# Patient Record
Sex: MALE | Race: WHITE | NOT HISPANIC OR LATINO | URBAN - METROPOLITAN AREA
[De-identification: names, ages, dates, MRNs, and addresses within clinical notes are randomized per-mention and may not be internally consistent; named-entity substitution may affect disease eponyms.]

---

## 2017-02-08 ENCOUNTER — IMPORTED ENCOUNTER (OUTPATIENT)
Dept: URBAN - METROPOLITAN AREA CLINIC 9 | Facility: CLINIC | Age: 82
End: 2017-02-08

## 2017-06-08 ENCOUNTER — IMPORTED ENCOUNTER (OUTPATIENT)
Dept: URBAN - METROPOLITAN AREA CLINIC 9 | Facility: CLINIC | Age: 82
End: 2017-06-08

## 2017-08-09 ENCOUNTER — IMPORTED ENCOUNTER (OUTPATIENT)
Dept: URBAN - METROPOLITAN AREA CLINIC 9 | Facility: CLINIC | Age: 82
End: 2017-08-09

## 2018-02-07 ENCOUNTER — IMPORTED ENCOUNTER (OUTPATIENT)
Dept: URBAN - METROPOLITAN AREA CLINIC 9 | Facility: CLINIC | Age: 83
End: 2018-02-07

## 2018-06-27 ENCOUNTER — IMPORTED ENCOUNTER (OUTPATIENT)
Dept: URBAN - METROPOLITAN AREA CLINIC 9 | Facility: CLINIC | Age: 83
End: 2018-06-27

## 2018-08-08 ENCOUNTER — IMPORTED ENCOUNTER (OUTPATIENT)
Dept: URBAN - METROPOLITAN AREA CLINIC 9 | Facility: CLINIC | Age: 83
End: 2018-08-08

## 2019-02-04 ENCOUNTER — IMPORTED ENCOUNTER (OUTPATIENT)
Dept: URBAN - METROPOLITAN AREA CLINIC 9 | Facility: CLINIC | Age: 84
End: 2019-02-04

## 2019-06-19 ENCOUNTER — IMPORTED ENCOUNTER (OUTPATIENT)
Dept: URBAN - METROPOLITAN AREA CLINIC 9 | Facility: CLINIC | Age: 84
End: 2019-06-19

## 2019-12-09 ENCOUNTER — IMPORTED ENCOUNTER (OUTPATIENT)
Dept: URBAN - METROPOLITAN AREA CLINIC 9 | Facility: CLINIC | Age: 84
End: 2019-12-09

## 2020-06-24 ENCOUNTER — IMPORTED ENCOUNTER (OUTPATIENT)
Dept: URBAN - METROPOLITAN AREA CLINIC 9 | Facility: CLINIC | Age: 85
End: 2020-06-24

## 2020-08-03 ENCOUNTER — IMPORTED ENCOUNTER (OUTPATIENT)
Dept: URBAN - METROPOLITAN AREA CLINIC 9 | Facility: CLINIC | Age: 85
End: 2020-08-03

## 2021-01-11 ENCOUNTER — IMPORTED ENCOUNTER (OUTPATIENT)
Dept: URBAN - METROPOLITAN AREA CLINIC 9 | Facility: CLINIC | Age: 86
End: 2021-01-11

## 2021-05-17 ENCOUNTER — IMPORTED ENCOUNTER (OUTPATIENT)
Dept: URBAN - METROPOLITAN AREA CLINIC 9 | Facility: CLINIC | Age: 86
End: 2021-05-17

## 2021-06-21 ENCOUNTER — IMPORTED ENCOUNTER (OUTPATIENT)
Dept: URBAN - METROPOLITAN AREA CLINIC 9 | Facility: CLINIC | Age: 86
End: 2021-06-21

## 2021-06-21 PROBLEM — H25.13: Noted: 2021-06-21

## 2021-06-21 PROBLEM — H11.153: Noted: 2021-06-21

## 2021-06-21 PROBLEM — H35.3111: Noted: 2021-06-21

## 2021-06-21 PROBLEM — H35.3221: Noted: 2021-06-21

## 2021-10-16 ASSESSMENT — KERATOMETRY
OD_K1POWER_DIOPTERS: 43.75
OS_K1POWER_DIOPTERS: 43.75
OD_K1POWER_DIOPTERS: 43.25
OD_AXISANGLE2_DEGREES: 174
OS_AXISANGLE2_DEGREES: 85
OS_AXISANGLE2_DEGREES: 156
OD_AXISANGLE2_DEGREES: 1
OS_K2POWER_DIOPTERS: 44
OD_K2POWER_DIOPTERS: 45
OS_AXISANGLE_DEGREES: 115
OD_K2POWER_DIOPTERS: 43
OD_AXISANGLE2_DEGREES: 66
OD_AXISANGLE_DEGREES: 84
OS_AXISANGLE_DEGREES: 175
OD_K1POWER_DIOPTERS: 43.5
OS_AXISANGLE2_DEGREES: 171
OS_AXISANGLE2_DEGREES: 25
OS_K2POWER_DIOPTERS: 44.25
OS_K1POWER_DIOPTERS: 44
OD_AXISANGLE_DEGREES: 156
OD_AXISANGLE_DEGREES: 76
OS_AXISANGLE_DEGREES: 81
OD_K2POWER_DIOPTERS: 44
OS_K1POWER_DIOPTERS: 43.75
OS_K2POWER_DIOPTERS: 43.75
OD_AXISANGLE2_DEGREES: 166
OS_AXISANGLE_DEGREES: 66
OD_AXISANGLE2_DEGREES: 83
OD_K1POWER_DIOPTERS: 43.5
OD_AXISANGLE_DEGREES: 173
OD_AXISANGLE_DEGREES: 91
OS_K1POWER_DIOPTERS: 43.75
OD_K2POWER_DIOPTERS: 44
OD_K1POWER_DIOPTERS: 44
OS_K2POWER_DIOPTERS: 44.25
OD_K2POWER_DIOPTERS: 44

## 2021-10-16 ASSESSMENT — VISUAL ACUITY
OD_CC: 20/25 - SN
OS_CC: 20/400 SN
OD_SC: 20/60 SN
OD_CC: 20/40 SN
OD_CC: 20/20 -2 SN
OD_CC: 20/30 SN
OD_CC: 20/25 SN
OD_CC: 20/30 -2 SN
OS_SC: CF 2FT SN
OS_CC: 20/400 SN
OD_CC: 20/25 SN
OS_CC: 20/400 SN
OD_CC: 20/30 -2 SN
OD_CC: 20/30 SN
OD_CC: 20/25 -2 SN
OS_CC: 20/400 SN
OD_CC: 20/25 - SN
OS_CC: 20/400 SN
OS_CC: 20/400 SN
OD_CC: 20/25 -2 SN
OS_CC: 20/400 SN
OS_CC: 20/400 SN
OD_CC: 20/20 SN
OS_CC: 20/400 SN
OS_CC: 20/200 - SN
OD_CC: 20/20 SN
OD_CC: 20/25 - SN
OD_CC: 20/30 - SN
OS_CC: 20/400 SN
OD_CC: 20/20 - SN
OD_CC: 20/20 SN
OD_CC: 20/30 - SN
OD_CC: 20/30 + SN
OS_CC: 20/400 SN
OD_CC: 20/40 SN
OS_CC: 20/400 SN

## 2021-10-16 ASSESSMENT — TONOMETRY
OS_IOP_MMHG: 18
OS_IOP_MMHG: 15
OD_IOP_MMHG: 16
OD_IOP_MMHG: 15
OS_IOP_MMHG: 13
OS_IOP_MMHG: 11
OS_IOP_MMHG: 13
OD_IOP_MMHG: 15
OD_IOP_MMHG: 12
OS_IOP_MMHG: 19
OD_IOP_MMHG: 14
OD_IOP_MMHG: 13
OS_IOP_MMHG: 10
OS_IOP_MMHG: 10
OS_IOP_MMHG: 13
OS_IOP_MMHG: 11
OD_IOP_MMHG: 14
OS_IOP_MMHG: 16
OD_IOP_MMHG: 15
OD_IOP_MMHG: 10
OD_IOP_MMHG: 18
OD_IOP_MMHG: 11
OS_IOP_MMHG: 14
OD_IOP_MMHG: 13
OS_IOP_MMHG: 13
OD_IOP_MMHG: 10

## 2021-11-02 ENCOUNTER — PREPPED CHART (OUTPATIENT)
Dept: URBAN - METROPOLITAN AREA CLINIC 4 | Facility: CLINIC | Age: 86
End: 2021-11-02

## 2021-11-02 ASSESSMENT — KERATOMETRY
OS_K2POWER_DIOPTERS: 44.25
OD_AXISANGLE_DEGREES: 84
OD_K2POWER_DIOPTERS: 44
OS_AXISANGLE_DEGREES: 115
OS_AXISANGLE2_DEGREES: 25
OD_AXISANGLE2_DEGREES: 174
OS_K1POWER_DIOPTERS: 43.75
OD_K1POWER_DIOPTERS: 43.25

## 2021-11-08 ENCOUNTER — ESTABLISHED PATIENT (OUTPATIENT)
Dept: URBAN - METROPOLITAN AREA CLINIC 4 | Facility: CLINIC | Age: 86
End: 2021-11-08

## 2021-11-08 DIAGNOSIS — H35.3221: ICD-10-CM

## 2021-11-08 DIAGNOSIS — H35.3111: ICD-10-CM

## 2021-11-08 PROCEDURE — 92014 COMPRE OPH EXAM EST PT 1/>: CPT

## 2021-11-08 PROCEDURE — 92134 CPTRZ OPH DX IMG PST SGM RTA: CPT

## 2021-11-08 ASSESSMENT — TONOMETRY
OD_IOP_MMHG: 11
OS_IOP_MMHG: 14

## 2021-11-08 ASSESSMENT — KERATOMETRY
OD_K2POWER_DIOPTERS: 44
OD_AXISANGLE2_DEGREES: 174
OD_K1POWER_DIOPTERS: 43.25
OD_AXISANGLE_DEGREES: 84
OS_K2POWER_DIOPTERS: 44.25
OS_AXISANGLE2_DEGREES: 25
OS_AXISANGLE_DEGREES: 115
OS_K1POWER_DIOPTERS: 43.75

## 2021-11-08 ASSESSMENT — VISUAL ACUITY
OS_CC: 20/300
OD_CC: 20/30-2
OS_PH: 20/400

## 2022-03-04 NOTE — PATIENT DISCUSSION
Patient has appt with Dr Fran Jarquin later today. Discussed finding and eitology; recommend carotid Doppler, wrote out for patient to take with her to appt.

## 2022-06-22 ENCOUNTER — ESTABLISHED PATIENT (OUTPATIENT)
Dept: URBAN - METROPOLITAN AREA CLINIC 4 | Facility: CLINIC | Age: 87
End: 2022-06-22

## 2022-06-22 DIAGNOSIS — H35.3111: ICD-10-CM

## 2022-06-22 DIAGNOSIS — H11.153: ICD-10-CM

## 2022-06-22 DIAGNOSIS — H35.3221: ICD-10-CM

## 2022-06-22 PROCEDURE — 92014 COMPRE OPH EXAM EST PT 1/>: CPT

## 2022-06-22 PROCEDURE — 92134 CPTRZ OPH DX IMG PST SGM RTA: CPT

## 2022-06-22 PROCEDURE — 92015 DETERMINE REFRACTIVE STATE: CPT

## 2022-06-22 ASSESSMENT — KERATOMETRY
OD_K1POWER_DIOPTERS: 43.25
OD_AXISANGLE2_DEGREES: 177
OS_AXISANGLE_DEGREES: 45
OS_K1POWER_DIOPTERS: 44.00
OS_AXISANGLE2_DEGREES: 135
OS_K2POWER_DIOPTERS: 44.25
OD_K2POWER_DIOPTERS: 44.00
OD_AXISANGLE_DEGREES: 87

## 2022-06-22 ASSESSMENT — VISUAL ACUITY
OD_GLARE: 20/40-1
OS_SC: 20/400
OD_SC: 20/50
OU_CC: 20/25
OS_CC: 20/400
OU_SC: 20/50
OD_CC: 20/25-1

## 2022-06-22 ASSESSMENT — TONOMETRY
OD_IOP_MMHG: 14
OS_IOP_MMHG: 14

## 2022-11-07 ENCOUNTER — ESTABLISHED PATIENT (OUTPATIENT)
Dept: URBAN - METROPOLITAN AREA CLINIC 4 | Facility: CLINIC | Age: 87
End: 2022-11-07

## 2022-11-07 DIAGNOSIS — H35.3111: ICD-10-CM

## 2022-11-07 DIAGNOSIS — H35.3221: ICD-10-CM

## 2022-11-07 PROCEDURE — 92134 CPTRZ OPH DX IMG PST SGM RTA: CPT

## 2022-11-07 PROCEDURE — 92014 COMPRE OPH EXAM EST PT 1/>: CPT

## 2022-11-07 ASSESSMENT — TONOMETRY
OD_IOP_MMHG: 12
OS_IOP_MMHG: 11

## 2022-11-07 ASSESSMENT — KERATOMETRY
OD_AXISANGLE2_DEGREES: 177
OD_K1POWER_DIOPTERS: 43.25
OS_AXISANGLE2_DEGREES: 135
OS_AXISANGLE_DEGREES: 45
OD_K2POWER_DIOPTERS: 44.00
OS_K1POWER_DIOPTERS: 44.00
OS_K2POWER_DIOPTERS: 44.25
OD_AXISANGLE_DEGREES: 87

## 2022-11-07 ASSESSMENT — VISUAL ACUITY
OS_SC: 20/400
OD_SC: 20/30-2

## 2023-06-12 ENCOUNTER — ESTABLISHED PATIENT (OUTPATIENT)
Dept: URBAN - METROPOLITAN AREA CLINIC 4 | Facility: CLINIC | Age: 88
End: 2023-06-12

## 2023-06-12 DIAGNOSIS — H25.13: ICD-10-CM

## 2023-06-12 DIAGNOSIS — H11.153: ICD-10-CM

## 2023-06-12 DIAGNOSIS — H35.3221: ICD-10-CM

## 2023-06-12 DIAGNOSIS — H35.3111: ICD-10-CM

## 2023-06-12 PROCEDURE — 92014 COMPRE OPH EXAM EST PT 1/>: CPT

## 2023-06-12 PROCEDURE — 92134 CPTRZ OPH DX IMG PST SGM RTA: CPT

## 2023-06-12 PROCEDURE — 92015 DETERMINE REFRACTIVE STATE: CPT

## 2023-06-12 ASSESSMENT — KERATOMETRY
OS_K1POWER_DIOPTERS: 44.00
OS_K2POWER_DIOPTERS: 44.25
OD_AXISANGLE2_DEGREES: 3
OD_K1POWER_DIOPTERS: 43.00
OS_AXISANGLE2_DEGREES: 12
OD_AXISANGLE_DEGREES: 93
OS_AXISANGLE_DEGREES: 102
OD_K2POWER_DIOPTERS: 44.00

## 2023-06-12 ASSESSMENT — VISUAL ACUITY
OS_CC: 20/400
OD_CC: 20/40+2
OU_CC: 20/40+2
OD_GLARE: 20/50

## 2023-06-12 ASSESSMENT — TONOMETRY
OS_IOP_MMHG: 12
OD_IOP_MMHG: 13

## 2023-11-21 ENCOUNTER — ESTABLISHED PATIENT (OUTPATIENT)
Facility: LOCATION | Age: 88
End: 2023-11-21

## 2023-11-21 DIAGNOSIS — H35.3222: ICD-10-CM

## 2023-11-21 DIAGNOSIS — H35.3111: ICD-10-CM

## 2023-11-21 PROCEDURE — 92014 COMPRE OPH EXAM EST PT 1/>: CPT

## 2023-11-21 PROCEDURE — 92134 CPTRZ OPH DX IMG PST SGM RTA: CPT

## 2023-11-21 ASSESSMENT — TONOMETRY
OD_IOP_MMHG: 17
OS_IOP_MMHG: 17

## 2023-11-21 ASSESSMENT — VISUAL ACUITY
OS_CC: CF 2FT
OD_CC: 20/20-1

## 2024-06-27 ENCOUNTER — ESTABLISHED PATIENT (OUTPATIENT)
Facility: LOCATION | Age: 89
End: 2024-06-27

## 2024-06-27 DIAGNOSIS — H11.153: ICD-10-CM

## 2024-06-27 DIAGNOSIS — H35.3111: ICD-10-CM

## 2024-06-27 DIAGNOSIS — H35.3222: ICD-10-CM

## 2024-06-27 DIAGNOSIS — H25.13: ICD-10-CM

## 2024-06-27 PROCEDURE — 92015 DETERMINE REFRACTIVE STATE: CPT

## 2024-06-27 PROCEDURE — 92134 CPTRZ OPH DX IMG PST SGM RTA: CPT

## 2024-06-27 PROCEDURE — 92014 COMPRE OPH EXAM EST PT 1/>: CPT

## 2024-06-27 ASSESSMENT — VISUAL ACUITY
OD_CC: 20/25
OS_CC: 20/400
OU_CC: 20/25
OD_GLARE: 20/50-1

## 2024-06-27 ASSESSMENT — TONOMETRY
OS_IOP_MMHG: 12
OD_IOP_MMHG: 14

## 2024-06-27 ASSESSMENT — KERATOMETRY
OD_K1POWER_DIOPTERS: 43.25
OS_K1POWER_DIOPTERS: 43.75
OS_AXISANGLE2_DEGREES: 138
OD_AXISANGLE2_DEGREES: 177
OS_AXISANGLE_DEGREES: 48
OS_K2POWER_DIOPTERS: 44.25
OD_AXISANGLE_DEGREES: 87
OD_K2POWER_DIOPTERS: 44.00

## 2024-08-13 ENCOUNTER — COMPREHENSIVE EXAM (OUTPATIENT)
Dept: URBAN - METROPOLITAN AREA CLINIC 18 | Facility: CLINIC | Age: 89
End: 2024-08-13

## 2024-08-13 DIAGNOSIS — H35.371: ICD-10-CM

## 2024-08-13 DIAGNOSIS — H43.813: ICD-10-CM

## 2024-08-13 DIAGNOSIS — H35.3111: ICD-10-CM

## 2024-08-13 DIAGNOSIS — H35.3222: ICD-10-CM

## 2024-08-13 PROCEDURE — 92201 OPSCPY EXTND RTA DRAW UNI/BI: CPT

## 2024-08-13 PROCEDURE — 92014 COMPRE OPH EXAM EST PT 1/>: CPT

## 2024-08-13 PROCEDURE — 92134 CPTRZ OPH DX IMG PST SGM RTA: CPT

## 2024-08-13 PROCEDURE — G2211 COMPLEX E/M VISIT ADD ON: HCPCS

## 2024-08-13 ASSESSMENT — KERATOMETRY
OD_K1POWER_DIOPTERS: 43.25
OD_K2POWER_DIOPTERS: 44.00
OS_K2POWER_DIOPTERS: 44.25
OS_K1POWER_DIOPTERS: 43.75
OS_AXISANGLE2_DEGREES: 138
OD_AXISANGLE_DEGREES: 87
OS_AXISANGLE_DEGREES: 48
OD_AXISANGLE2_DEGREES: 177

## 2024-08-13 ASSESSMENT — VISUAL ACUITY
OD_CC: 20/20-1
OS_CC: 20/400

## 2024-08-13 ASSESSMENT — TONOMETRY
OD_IOP_MMHG: 7
OS_IOP_MMHG: 9

## 2025-02-18 ENCOUNTER — COMPREHENSIVE EXAM (OUTPATIENT)
Age: OVER 89
End: 2025-02-18

## 2025-02-18 DIAGNOSIS — H25.13: ICD-10-CM

## 2025-02-18 DIAGNOSIS — H35.3111: ICD-10-CM

## 2025-02-18 DIAGNOSIS — H35.3222: ICD-10-CM

## 2025-02-18 DIAGNOSIS — H35.371: ICD-10-CM

## 2025-02-18 DIAGNOSIS — H43.813: ICD-10-CM

## 2025-02-18 PROCEDURE — 92014 COMPRE OPH EXAM EST PT 1/>: CPT

## 2025-02-18 PROCEDURE — 92134 CPTRZ OPH DX IMG PST SGM RTA: CPT

## 2025-02-18 PROCEDURE — 92201 OPSCPY EXTND RTA DRAW UNI/BI: CPT

## 2025-07-23 ENCOUNTER — COMPREHENSIVE EXAM (OUTPATIENT)
Age: OVER 89
End: 2025-07-23

## 2025-07-23 DIAGNOSIS — H35.3222: ICD-10-CM

## 2025-07-23 DIAGNOSIS — H35.3111: ICD-10-CM

## 2025-07-23 DIAGNOSIS — H25.13: ICD-10-CM

## 2025-07-23 DIAGNOSIS — H11.153: ICD-10-CM

## 2025-07-23 DIAGNOSIS — H43.813: ICD-10-CM

## 2025-07-23 DIAGNOSIS — H35.371: ICD-10-CM

## 2025-07-23 PROCEDURE — 92014 COMPRE OPH EXAM EST PT 1/>: CPT

## 2025-07-23 PROCEDURE — 92015 DETERMINE REFRACTIVE STATE: CPT

## 2025-08-19 ENCOUNTER — COMPREHENSIVE EXAM (OUTPATIENT)
Age: OVER 89
End: 2025-08-19

## 2025-08-19 DIAGNOSIS — H11.153: ICD-10-CM

## 2025-08-19 DIAGNOSIS — H35.3111: ICD-10-CM

## 2025-08-19 DIAGNOSIS — H35.3222: ICD-10-CM

## 2025-08-19 DIAGNOSIS — H25.13: ICD-10-CM

## 2025-08-19 DIAGNOSIS — H35.371: ICD-10-CM

## 2025-08-19 DIAGNOSIS — H43.813: ICD-10-CM

## 2025-08-19 PROCEDURE — 92201 OPSCPY EXTND RTA DRAW UNI/BI: CPT

## 2025-08-19 PROCEDURE — 92134 CPTRZ OPH DX IMG PST SGM RTA: CPT

## 2025-08-19 PROCEDURE — 92014 COMPRE OPH EXAM EST PT 1/>: CPT
